# Patient Record
Sex: FEMALE | Race: BLACK OR AFRICAN AMERICAN | URBAN - METROPOLITAN AREA
[De-identification: names, ages, dates, MRNs, and addresses within clinical notes are randomized per-mention and may not be internally consistent; named-entity substitution may affect disease eponyms.]

---

## 2023-04-05 ENCOUNTER — WEB ENCOUNTER (OUTPATIENT)
Dept: URBAN - METROPOLITAN AREA CLINIC 92 | Facility: CLINIC | Age: 23
End: 2023-04-05

## 2023-04-05 ENCOUNTER — OFFICE VISIT (OUTPATIENT)
Dept: URBAN - METROPOLITAN AREA CLINIC 92 | Facility: CLINIC | Age: 23
End: 2023-04-05
Payer: COMMERCIAL

## 2023-04-05 ENCOUNTER — TELEPHONE ENCOUNTER (OUTPATIENT)
Dept: URBAN - METROPOLITAN AREA CLINIC 92 | Facility: CLINIC | Age: 23
End: 2023-04-05

## 2023-04-05 VITALS
SYSTOLIC BLOOD PRESSURE: 109 MMHG | WEIGHT: 174 LBS | HEART RATE: 82 BPM | BODY MASS INDEX: 30.83 KG/M2 | HEIGHT: 63 IN | DIASTOLIC BLOOD PRESSURE: 82 MMHG | TEMPERATURE: 97 F

## 2023-04-05 DIAGNOSIS — R11.2 NAUSEA AND VOMITING, UNSPECIFIED VOMITING TYPE: ICD-10-CM

## 2023-04-05 DIAGNOSIS — K21.9 GASTROESOPHAGEAL REFLUX DISEASE WITHOUT ESOPHAGITIS: ICD-10-CM

## 2023-04-05 DIAGNOSIS — R14.2 BELCHING: ICD-10-CM

## 2023-04-05 PROBLEM — 266435005: Status: ACTIVE | Noted: 2023-04-05

## 2023-04-05 PROCEDURE — 99204 OFFICE O/P NEW MOD 45 MIN: CPT

## 2023-04-05 RX ORDER — METOCLOPRAMIDE 10 MG/1
1 TABLET BEFORE MEALS TABLET ORAL TWICE A DAY
Status: ACTIVE | COMMUNITY

## 2023-04-05 RX ORDER — ESOMEPRAZOLE MAGNESIUM 40 MG/1
1 CAPSULE CAPSULE, DELAYED RELEASE ORAL ONCE A DAY
Status: ACTIVE | COMMUNITY

## 2023-04-05 NOTE — HPI-TODAY'S VISIT:
22-year-old female patient presents today for an ER visit follow-up.  She was seen at [facility] on 4-2-2023 with complaints of worsening constant moderate belching.  She reported associated nausea, emesis, sharp-like pain and decreased appetite. During ER visit a CT a/p was obtained that demonstrated no acute abnormalities.  Labs demonstrated normal lipase, CMP, CBC.  Patient was stabilized and discharged with Nexium 40 mg, metoclopramide 10 mg and simethicone.  Currently states that she feels somewhat better today.  She did have a vomiting episode yesterday morning with no hematemesis.  She is not having as much nausea.  She does have a globus sensation but denies any dysphagia, odynophagia, GERD symptoms.  She uses NSAIDs very rarely.  She has been more constipated recently since she has not been eating as much but she did have a bowel movement yesterday.  No hematochezia, melena.  She is also lost 6 pounds in the last 2 weeks since she has not been eating.  She states she finally feels hungry today and is not having abdominal pain.  She has no family history of any GI related cancers.l

## 2023-04-07 ENCOUNTER — OFFICE VISIT (OUTPATIENT)
Dept: URBAN - METROPOLITAN AREA SURGERY CENTER 16 | Facility: SURGERY CENTER | Age: 23
End: 2023-04-07
Payer: COMMERCIAL

## 2023-04-07 ENCOUNTER — CLAIMS CREATED FROM THE CLAIM WINDOW (OUTPATIENT)
Dept: URBAN - METROPOLITAN AREA CLINIC 4 | Facility: CLINIC | Age: 23
End: 2023-04-07
Payer: COMMERCIAL

## 2023-04-07 DIAGNOSIS — K29.60 ADENOPAPILLOMATOSIS GASTRICA: ICD-10-CM

## 2023-04-07 DIAGNOSIS — K31.A0 GASTRIC INTESTINAL METAPLASIA, UNSPECIFIED: ICD-10-CM

## 2023-04-07 PROCEDURE — 88305 TISSUE EXAM BY PATHOLOGIST: CPT | Performed by: PATHOLOGY

## 2023-04-07 PROCEDURE — 43239 EGD BIOPSY SINGLE/MULTIPLE: CPT | Performed by: INTERNAL MEDICINE

## 2023-04-07 PROCEDURE — G8907 PT DOC NO EVENTS ON DISCHARG: HCPCS | Performed by: INTERNAL MEDICINE

## 2023-04-07 PROCEDURE — 88342 IMHCHEM/IMCYTCHM 1ST ANTB: CPT | Performed by: PATHOLOGY

## 2023-04-07 RX ORDER — METOCLOPRAMIDE 10 MG/1
1 TABLET BEFORE MEALS TABLET ORAL TWICE A DAY
Status: ACTIVE | COMMUNITY

## 2023-04-07 RX ORDER — ESOMEPRAZOLE MAGNESIUM 40 MG/1
1 CAPSULE CAPSULE, DELAYED RELEASE ORAL ONCE A DAY
Status: ACTIVE | COMMUNITY

## 2023-05-04 ENCOUNTER — OFFICE VISIT (OUTPATIENT)
Dept: URBAN - METROPOLITAN AREA CLINIC 91 | Facility: CLINIC | Age: 23
End: 2023-05-04
Payer: COMMERCIAL

## 2023-05-04 ENCOUNTER — OFFICE VISIT (OUTPATIENT)
Dept: URBAN - METROPOLITAN AREA CLINIC 92 | Facility: CLINIC | Age: 23
End: 2023-05-04
Payer: COMMERCIAL

## 2023-05-04 VITALS
HEIGHT: 63 IN | DIASTOLIC BLOOD PRESSURE: 85 MMHG | TEMPERATURE: 97 F | BODY MASS INDEX: 28.88 KG/M2 | WEIGHT: 163 LBS | SYSTOLIC BLOOD PRESSURE: 119 MMHG | HEART RATE: 81 BPM

## 2023-05-04 DIAGNOSIS — R14.0 ABDOMINAL BLOATING: ICD-10-CM

## 2023-05-04 DIAGNOSIS — R11.2 NAUSEA AND VOMITING, UNSPECIFIED VOMITING TYPE: ICD-10-CM

## 2023-05-04 DIAGNOSIS — R14.2 BELCHING: ICD-10-CM

## 2023-05-04 DIAGNOSIS — K29.50 OTHER CHRONIC GASTRITIS WITHOUT HEMORRHAGE: ICD-10-CM

## 2023-05-04 PROBLEM — 8493009: Status: ACTIVE | Noted: 2023-05-04

## 2023-05-04 PROCEDURE — 83014 H PYLORI DRUG ADMIN: CPT

## 2023-05-04 PROCEDURE — 83013 H PYLORI (C-13) BREATH: CPT

## 2023-05-04 PROCEDURE — 99214 OFFICE O/P EST MOD 30 MIN: CPT

## 2023-05-04 NOTE — HPI-TODAY'S VISIT:
4/5/23 22-year-old female patient presents today for an ER visit follow-up.  She was seen at [facility] on 4-2-2023 with complaints of worsening constant moderate belching.  She reported associated nausea, emesis, sharp-like pain and decreased appetite. During ER visit a CT a/p was obtained that demonstrated no acute abnormalities.  Labs demonstrated normal lipase, CMP, CBC.  Patient was stabilized and discharged with Nexium 40 mg, metoclopramide 10 mg and simethicone.  Currently, states that she feels somewhat better today.  She did have a vomiting episode yesterday morning with no hematemesis.  She is not having as much nausea.  She does have a globus sensation but denies any dysphagia, odynophagia, GERD symptoms.  She uses NSAIDs very rarely.  She has been more constipated recently since she has not been eating as much, but she did have a bowel movement yesterday.  No hematochezia, melena.  She is also lost 6 pounds in the last 2 weeks since she has not been eating.  She states she finally feels hungry today and is not having abdominal pain.  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 Today she states she has never been tx for HP in the past. She did try Nexium 40mg BID for several weeks, but she was having increased dry mouth and fatigue with this, so she d/c. She did note her belching did not get any better. She is still having issues with belching and this has remained the same since last OV. She is no longer having any n/v. She has no GERD sx, dysphagia, odynophagia. No NSAID uses. She is still afraid to eat due to anxiety that her belching will get worse.  She is still having constipation since she has not been eating. No hematochezia, melena.

## 2023-05-05 ENCOUNTER — TELEPHONE ENCOUNTER (OUTPATIENT)
Dept: URBAN - METROPOLITAN AREA CLINIC 92 | Facility: CLINIC | Age: 23
End: 2023-05-05

## 2023-05-05 RX ORDER — TETRACYCLINE HYDROCHLORIDE 500 MG/1
1 CAPSULE CAPSULE ORAL
Qty: 56 CAPSULE | Refills: 0 | OUTPATIENT
Start: 2023-05-05 | End: 2023-05-19

## 2023-05-05 RX ORDER — PANTOPRAZOLE SODIUM 40 MG/1
1 TABLET TABLET, DELAYED RELEASE ORAL TWICE A DAY
Qty: 28 TABLET | Refills: 0 | OUTPATIENT
Start: 2023-05-05

## 2023-05-05 RX ORDER — BISMUTH SUBSALICYLATE 262 MG/1
2 TABLETS TABLET, CHEWABLE ORAL
Qty: 112 TABLET | Refills: 0 | OUTPATIENT
Start: 2023-05-05 | End: 2023-05-19

## 2023-05-05 RX ORDER — METRONIDAZOLE 250 MG/1
1 TABLET TABLET ORAL
Qty: 56 TABLET | Refills: 0 | OUTPATIENT
Start: 2023-05-05 | End: 2023-05-19

## 2023-06-30 ENCOUNTER — OFFICE VISIT (OUTPATIENT)
Dept: URBAN - METROPOLITAN AREA CLINIC 92 | Facility: CLINIC | Age: 23
End: 2023-06-30
Payer: COMMERCIAL

## 2023-06-30 VITALS
TEMPERATURE: 96.6 F | BODY MASS INDEX: 25.52 KG/M2 | WEIGHT: 144 LBS | SYSTOLIC BLOOD PRESSURE: 123 MMHG | DIASTOLIC BLOOD PRESSURE: 84 MMHG | HEIGHT: 63 IN | HEART RATE: 74 BPM

## 2023-06-30 DIAGNOSIS — R14.2 BELCHING: ICD-10-CM

## 2023-06-30 DIAGNOSIS — R11.2 NAUSEA AND VOMITING, UNSPECIFIED VOMITING TYPE: ICD-10-CM

## 2023-06-30 DIAGNOSIS — K29.50 OTHER CHRONIC GASTRITIS WITHOUT HEMORRHAGE: ICD-10-CM

## 2023-06-30 DIAGNOSIS — R14.0 ABDOMINAL BLOATING: ICD-10-CM

## 2023-06-30 PROCEDURE — 99214 OFFICE O/P EST MOD 30 MIN: CPT

## 2023-06-30 NOTE — HPI-TODAY'S VISIT:
22-year-old female presents today for H. pylori treatment follow-up.  She originally presented in April 2023 for ER visit follow-up.  At that time she noted she was seen at [facility] with complaints of worsening constant moderate belching.  With this she reported nausea, emesis, sharp-like pain and decreased appetite.  CT of abdomen pelvis during ER visit demonstrated no acute abnormalities.  Labs were also stable.  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  She never been treated for H. pylori in the past so we obtained a breath test which was positive.  She was started on quad therapy but was only able to tolerate 3 to 4 days of the treatment.  She states that she had increased nausea and vomiting which brought her back to the ER.  Since then she has been seeing a holistic medicine doctor and has been trying to treat her HP.  She states that she has extreme sensitivity to antibiotics and would like to avoid this.  She is on a combination of lactic acid, amino acids, digestive enzymes, biotoxin binders and para digestive supplements.  She states she feels better with this but still has belching.  She is interested in obtaining an H. pylori breath test to see if her holistic approach has worked.  She is also interested in obtaining a SIBO breath test.  She currently denies any nausea, vomiting, fever, chills, dysphagia, odynophagia.  No NSAID use.  No lower GI complaints.  No family history of any GI related cancers.

## 2023-07-06 LAB
H PYLORI BREATH TEST: NOT DETECTED
H. PYLORI BREATH TEST: NOT DETECTED
INTERPRETATION: NOT DETECTED

## 2023-08-10 ENCOUNTER — OFFICE VISIT (OUTPATIENT)
Dept: URBAN - METROPOLITAN AREA CLINIC 92 | Facility: CLINIC | Age: 23
End: 2023-08-10
Payer: COMMERCIAL

## 2023-08-10 ENCOUNTER — DASHBOARD ENCOUNTERS (OUTPATIENT)
Age: 23
End: 2023-08-10

## 2023-08-10 VITALS
HEART RATE: 81 BPM | HEIGHT: 63 IN | SYSTOLIC BLOOD PRESSURE: 110 MMHG | WEIGHT: 148 LBS | DIASTOLIC BLOOD PRESSURE: 76 MMHG | BODY MASS INDEX: 26.22 KG/M2 | TEMPERATURE: 97 F

## 2023-08-10 DIAGNOSIS — K29.50 OTHER CHRONIC GASTRITIS WITHOUT HEMORRHAGE: ICD-10-CM

## 2023-08-10 DIAGNOSIS — R14.0 ABDOMINAL BLOATING: ICD-10-CM

## 2023-08-10 DIAGNOSIS — R14.2 BELCHING: ICD-10-CM

## 2023-08-10 DIAGNOSIS — R11.2 NAUSEA AND VOMITING, UNSPECIFIED VOMITING TYPE: ICD-10-CM

## 2023-08-10 PROCEDURE — 99213 OFFICE O/P EST LOW 20 MIN: CPT

## 2023-08-10 RX ORDER — ESOMEPRAZOLE MAGNESIUM 40 MG/1
1 CAPSULE CAPSULE, DELAYED RELEASE ORAL ONCE A DAY
Status: ON HOLD | COMMUNITY

## 2023-08-10 RX ORDER — PANTOPRAZOLE SODIUM 40 MG/1
1 TABLET TABLET, DELAYED RELEASE ORAL TWICE A DAY
Qty: 28 TABLET | Refills: 0 | Status: ON HOLD | COMMUNITY
Start: 2023-05-05

## 2023-08-10 RX ORDER — METOCLOPRAMIDE 10 MG/1
1 TABLET BEFORE MEALS TABLET ORAL TWICE A DAY
Status: ON HOLD | COMMUNITY

## 2023-08-10 NOTE — HPI-TODAY'S VISIT:
22-year-old female presents today for H. pylori treatment follow-up.  She originally presented in April 2023 for ER visit follow-up.  At that time she noted she was seen at [facility] with complaints of worsening constant moderate belching.  With this she reported nausea, emesis, sharp-like pain and decreased appetite.  CT of abdomen pelvis during ER visit demonstrated no acute abnormalities.  Labs were also stable.  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  She never been treated for H. pylori in the past so we obtained a breath test which was positive.  She was started on quad therapy but was only able to tolerate 3 to 4 days of the treatment.  She states that she had increased nausea and vomiting which brought her back to the ER.  Since then, she has been seeing a holistic medicine doctor and has been trying to treat her HP.  She states that she has extreme sensitivity to antibiotics and would like to avoid this.  She is on a combination of lactic acid, amino acids, digestive enzymes, biotoxin binders and para digestive supplements. Since last visit we obtained HP breath test which was neg. We also tested for SIBO and that was neg. She states she feels better and is no longer having sx. She is moving to [place] this week to start her [employer] program.  She currently denies any nausea, vomiting, fever, chills, dysphagia, odynophagia.  No NSAID use.  No lower GI complaints.  No family history of any GI related cancers.